# Patient Record
Sex: MALE | Race: WHITE | NOT HISPANIC OR LATINO | Employment: OTHER | URBAN - METROPOLITAN AREA
[De-identification: names, ages, dates, MRNs, and addresses within clinical notes are randomized per-mention and may not be internally consistent; named-entity substitution may affect disease eponyms.]

---

## 2020-11-10 ENCOUNTER — TELEPHONE (OUTPATIENT)
Dept: PAIN MEDICINE | Facility: CLINIC | Age: 25
End: 2020-11-10

## 2020-11-18 ENCOUNTER — CONSULT (OUTPATIENT)
Dept: PAIN MEDICINE | Facility: CLINIC | Age: 25
End: 2020-11-18
Payer: COMMERCIAL

## 2020-11-18 VITALS
WEIGHT: 169.6 LBS | BODY MASS INDEX: 24.28 KG/M2 | SYSTOLIC BLOOD PRESSURE: 112 MMHG | TEMPERATURE: 97.4 F | DIASTOLIC BLOOD PRESSURE: 65 MMHG | HEIGHT: 70 IN | RESPIRATION RATE: 18 BRPM | HEART RATE: 80 BPM

## 2020-11-18 DIAGNOSIS — M54.50 CHRONIC BILATERAL LOW BACK PAIN WITHOUT SCIATICA: ICD-10-CM

## 2020-11-18 DIAGNOSIS — G89.29 CHRONIC BILATERAL LOW BACK PAIN WITHOUT SCIATICA: ICD-10-CM

## 2020-11-18 DIAGNOSIS — M51.36 LUMBAR DEGENERATIVE DISC DISEASE: ICD-10-CM

## 2020-11-18 DIAGNOSIS — M79.18 MYOFASCIAL PAIN SYNDROME: Primary | ICD-10-CM

## 2020-11-18 PROCEDURE — 99244 OFF/OP CNSLTJ NEW/EST MOD 40: CPT | Performed by: STUDENT IN AN ORGANIZED HEALTH CARE EDUCATION/TRAINING PROGRAM

## 2020-11-18 RX ORDER — BUPROPION HYDROCHLORIDE 150 MG/1
150 TABLET ORAL
COMMUNITY
Start: 2020-08-18 | End: 2021-08-18

## 2020-11-18 RX ORDER — ALBUTEROL SULFATE 2.5 MG/3ML
2.5 SOLUTION RESPIRATORY (INHALATION) EVERY 4 HOURS PRN
COMMUNITY
Start: 2020-09-28 | End: 2021-09-28

## 2020-11-18 RX ORDER — TIOTROPIUM BROMIDE INHALATION SPRAY 3.12 UG/1
SPRAY, METERED RESPIRATORY (INHALATION) DAILY
COMMUNITY
Start: 2020-08-18

## 2020-11-18 RX ORDER — MONTELUKAST SODIUM 10 MG/1
1 TABLET ORAL DAILY
COMMUNITY
Start: 2020-07-06

## 2020-11-18 RX ORDER — ALBUTEROL SULFATE 2.5 MG/3ML
SOLUTION RESPIRATORY (INHALATION)
COMMUNITY
Start: 2020-11-10

## 2020-11-18 RX ORDER — ESOMEPRAZOLE MAGNESIUM 40 MG/1
40 CAPSULE, DELAYED RELEASE ORAL
COMMUNITY
Start: 2020-08-18 | End: 2021-08-18

## 2020-11-18 RX ORDER — HYDROXYZINE HYDROCHLORIDE 25 MG/1
TABLET, FILM COATED ORAL
COMMUNITY
Start: 2020-11-10

## 2020-12-03 ENCOUNTER — TELEPHONE (OUTPATIENT)
Dept: RADIOLOGY | Facility: CLINIC | Age: 25
End: 2020-12-03

## 2020-12-08 ENCOUNTER — TELEPHONE (OUTPATIENT)
Dept: PAIN MEDICINE | Facility: CLINIC | Age: 25
End: 2020-12-08

## 2020-12-23 ENCOUNTER — PROCEDURE VISIT (OUTPATIENT)
Dept: PAIN MEDICINE | Facility: CLINIC | Age: 25
End: 2020-12-23
Payer: COMMERCIAL

## 2020-12-23 VITALS
WEIGHT: 173 LBS | HEART RATE: 88 BPM | HEIGHT: 70 IN | BODY MASS INDEX: 24.77 KG/M2 | DIASTOLIC BLOOD PRESSURE: 90 MMHG | RESPIRATION RATE: 18 BRPM | SYSTOLIC BLOOD PRESSURE: 136 MMHG

## 2020-12-23 DIAGNOSIS — M79.18 MYOFASCIAL PAIN SYNDROME: Primary | ICD-10-CM

## 2020-12-23 PROCEDURE — 20553 NJX 1/MLT TRIGGER POINTS 3/>: CPT | Performed by: STUDENT IN AN ORGANIZED HEALTH CARE EDUCATION/TRAINING PROGRAM

## 2020-12-23 RX ORDER — BUPIVACAINE HYDROCHLORIDE 2.5 MG/ML
4 INJECTION, SOLUTION EPIDURAL; INFILTRATION; INTRACAUDAL ONCE
Status: COMPLETED | OUTPATIENT
Start: 2020-12-23 | End: 2020-12-23

## 2020-12-23 RX ORDER — METHYLPREDNISOLONE ACETATE 40 MG/ML
40 INJECTION, SUSPENSION INTRA-ARTICULAR; INTRALESIONAL; INTRAMUSCULAR; SOFT TISSUE ONCE
Status: COMPLETED | OUTPATIENT
Start: 2020-12-23 | End: 2020-12-23

## 2020-12-23 RX ADMIN — BUPIVACAINE HYDROCHLORIDE 4 ML: 2.5 INJECTION, SOLUTION EPIDURAL; INFILTRATION; INTRACAUDAL at 12:17

## 2020-12-23 RX ADMIN — METHYLPREDNISOLONE ACETATE 40 MG: 40 INJECTION, SUSPENSION INTRA-ARTICULAR; INTRALESIONAL; INTRAMUSCULAR; SOFT TISSUE at 12:17

## 2021-01-22 ENCOUNTER — TELEPHONE (OUTPATIENT)
Dept: PAIN MEDICINE | Facility: CLINIC | Age: 26
End: 2021-01-22

## 2021-01-22 NOTE — TELEPHONE ENCOUNTER
S/w pt's wife per ROBBIN  States TPI on 12/23 helped for 1-2 weeks but pain returned to preprocedure level  Denies s/s of infection  States lower back has "knots" same as prior to procedure  Pt takes nothing OTC for pain at this time and has no f/u scheduled    Please advise next step

## 2021-01-22 NOTE — TELEPHONE ENCOUNTER
S/w pt and advised same  Pt is interested in injection  Wife and child currently have covid   Pt awaiting his covid test

## 2021-01-22 NOTE — TELEPHONE ENCOUNTER
He was taking ibuprofen and I advised he could also supplement with acetaminophen 1000mg TID PRN  Also can use ice 20min on 20 min off  We discussed possible GEOFF at his first visit which we can schedule to try to get him relief

## 2021-01-22 NOTE — TELEPHONE ENCOUNTER
Pt's wife Divya Colon is calling to say that the patients lower back is painful and swollen  He had a procedure on 12/23/20  She said they were told he would get a call after the procedure but they never received on  Please advise   # [phone]

## 2021-01-25 NOTE — TELEPHONE ENCOUNTER
S/w pt and scheduled LESI for 2/9/21 @ 2 pm  Gave pre procedure instructions, masking//vaccine policy and COVID screening  Per pt COVID test came back negative, will keep us updated if he develops any symptoms

## 2021-02-09 ENCOUNTER — TELEPHONE (OUTPATIENT)
Dept: RADIOLOGY | Facility: CLINIC | Age: 26
End: 2021-02-09

## 2021-02-09 ENCOUNTER — HOSPITAL ENCOUNTER (OUTPATIENT)
Dept: RADIOLOGY | Facility: CLINIC | Age: 26
Discharge: HOME | End: 2021-02-09

## 2021-02-12 NOTE — TELEPHONE ENCOUNTER
Patient called back to provide name of location he went to [facility] in [facility] [address]     He went there 4 times had to stop due to pain       Thank you

## 2021-02-12 NOTE — TELEPHONE ENCOUNTER
[facility] is requesting full physical therapy notes  Lmovm for pt to call back with info on where he had PT so I can obtain those records to send to [facility]

## 2021-02-16 NOTE — TELEPHONE ENCOUNTER
Duplicate task    Patient called back to provide name of location he went to [facility] in [facility]      He went there 4 times had to stop due to pain  S/w Misael Mendoza @ CPT and requested PT notes

## 2021-02-18 NOTE — TELEPHONE ENCOUNTER
Patient wife is calling in to say that her  was approved to get injections    approved from 2/4/21, please follow up

## 2021-02-19 NOTE — TELEPHONE ENCOUNTER
Rec [facility] auth  S/w pt and scheduled LESI for 2/23/21 315 pm  Gave pre procedure instructions, masking//vaccine policy and COVID screening

## 2021-02-19 NOTE — TELEPHONE ENCOUNTER
S/w pt and scheduled LESI for 2/23/21 315 pm  Gave pre procedure instructions, masking//vaccine policy and COVID screening

## 2021-02-22 NOTE — TELEPHONE ENCOUNTER
Pt wishes to be rescheduled for this Friday if possible  Patient has a scheduling conflict   Thank you       michelle [phone]

## 2021-02-25 ENCOUNTER — HOSPITAL ENCOUNTER (OUTPATIENT)
Dept: RADIOLOGY | Facility: CLINIC | Age: 26
Discharge: HOME | End: 2021-02-25
Attending: STUDENT IN AN ORGANIZED HEALTH CARE EDUCATION/TRAINING PROGRAM | Admitting: STUDENT IN AN ORGANIZED HEALTH CARE EDUCATION/TRAINING PROGRAM
Payer: COMMERCIAL

## 2021-02-25 VITALS
RESPIRATION RATE: 20 BRPM | HEART RATE: 98 BPM | OXYGEN SATURATION: 98 % | SYSTOLIC BLOOD PRESSURE: 160 MMHG | DIASTOLIC BLOOD PRESSURE: 98 MMHG | TEMPERATURE: 97.7 F

## 2021-02-25 DIAGNOSIS — M51.36 DEGENERATIVE DISC DISEASE, LUMBAR: ICD-10-CM

## 2021-02-25 PROCEDURE — 62323 NJX INTERLAMINAR LMBR/SAC: CPT | Performed by: STUDENT IN AN ORGANIZED HEALTH CARE EDUCATION/TRAINING PROGRAM

## 2021-02-25 RX ORDER — 0.9 % SODIUM CHLORIDE 0.9 %
4 VIAL (ML) INJECTION ONCE
Status: COMPLETED | OUTPATIENT
Start: 2021-02-25 | End: 2021-02-25

## 2021-02-25 RX ORDER — METHYLPREDNISOLONE ACETATE 80 MG/ML
80 INJECTION, SUSPENSION INTRA-ARTICULAR; INTRALESIONAL; INTRAMUSCULAR; PARENTERAL; SOFT TISSUE ONCE
Status: COMPLETED | OUTPATIENT
Start: 2021-02-25 | End: 2021-02-25

## 2021-02-25 RX ORDER — LIDOCAINE HYDROCHLORIDE 10 MG/ML
5 INJECTION, SOLUTION EPIDURAL; INFILTRATION; INTRACAUDAL; PERINEURAL ONCE
Status: COMPLETED | OUTPATIENT
Start: 2021-02-25 | End: 2021-02-25

## 2021-02-25 RX ADMIN — LIDOCAINE HYDROCHLORIDE 2 ML: 10 INJECTION, SOLUTION EPIDURAL; INFILTRATION; INTRACAUDAL; PERINEURAL at 09:35

## 2021-02-25 RX ADMIN — METHYLPREDNISOLONE ACETATE 80 MG: 80 INJECTION, SUSPENSION INTRA-ARTICULAR; INTRALESIONAL; INTRAMUSCULAR; PARENTERAL; SOFT TISSUE at 09:42

## 2021-02-25 RX ADMIN — IOHEXOL 1 ML: 300 INJECTION, SOLUTION INTRAVENOUS at 09:41

## 2021-02-25 RX ADMIN — SODIUM CHLORIDE 4 ML: 9 INJECTION INTRAMUSCULAR; INTRAVENOUS; SUBCUTANEOUS at 09:42

## 2021-02-25 NOTE — H&P
History of Present Illness: The patient is a 30 y o  male who presents with complaints of chronic bilateral low back pain    There is no problem list on file for this patient  Past Medical History:   Diagnosis Date    Asthma        No past surgical history on file  Current Outpatient Medications:     albuterol (2 5 mg/3 mL) 0 083 % nebulizer solution, Inhale 2 5 mg every 4 (four) hours as needed, Disp: , Rfl:     albuterol (2 5 mg/3 mL) 0 083 % nebulizer solution, TAKE 1 VIAL BY NEBULIZATION EVERY 4 HOURS AS NEEDED FOR WHEEZING , Disp: , Rfl:     buPROPion (WELLBUTRIN XL) 150 mg 24 hr tablet, Take 150 mg by mouth, Disp: , Rfl:     esomeprazole (NexIUM) 40 MG capsule, Take 40 mg by mouth, Disp: , Rfl:     hydrOXYzine HCL (ATARAX) 25 mg tablet, TAKE 1 TABLET (25 MG TOTAL) BY MOUTH EVERY 6 (SIX) HOURS AS NEEDED FOR ITCHING , Disp: , Rfl:     montelukast (SINGULAIR) 10 mg tablet, Take 1 tablet by mouth daily, Disp: , Rfl:     tiotropium (Spiriva Respimat) 2 5 MCG/ACT AERS inhaler, Inhale daily, Disp: , Rfl:     No Known Allergies    Physical Exam:   Vitals:    02/25/21 0925   BP: 157/93   Pulse: 82   Resp: 20   Temp: 97 7 °F (36 5 °C)   SpO2: 98%     General: Awake, Alert, Oriented x 3, Mood and affect appropriate  Respiratory: Respirations even and unlabored  Cardiovascular: Peripheral pulses intact; no edema  Musculoskeletal Exam: chronci bilateral low back pain    ASA Score: 2    Patient/Chart Verification  Patient ID Verified: Verbal  ID Band Applied: No  Consents Confirmed: To be obtained in the Pre-Procedure area  H&P( within 30 days) Verified: To be obtained in the Pre-Procedure area  Interval H&P(within 24 hr) Complete (required for Outpatients and Surgery Admit only): To be obtained in the Pre-Procedure area  Allergies Reviewed: Yes  Anticoag/NSAID held?: NA  Currently on antibiotics?: No  Pregnancy denied?: NA    Assessment:   1   Degenerative disc disease, lumbar        Plan: TOMER

## 2021-02-25 NOTE — DISCHARGE INSTR - LAB
Epidural Steroid Injection   WHAT YOU NEED TO KNOW:   An epidural steroid injection (GEOFF) is a procedure to inject steroid medicine into the epidural space  The epidural space is between your spinal cord and vertebrae  Steroids reduce inflammation and fluid buildup in your spine that may be causing pain  You may be given pain medicine along with the steroids  ACTIVITY  · Do not drive or operate machinery today  · No strenuous activity today - bending, lifting, etc   · You may resume normal activites starting tomorrow - start slowly and as tolerated  · You may shower today, but no tub baths or hot tubs  · You may have numbness for several hours from the local anesthetic  Please use caution and common sense, especially with weight-bearing activities  CARE OF THE INJECTION SITE  · If you have soreness or pain, apply ice to the area today (20 minutes on/20 minutes off)  · Starting tomorrow, you may use warm, moist heat or ice if needed  · You may have an increase or change in your discomfort for 36-48 hours after your treatment  · Apply ice and continue with any pain medication you have been prescribed  · Notify [facility] if you have any of the following: redness, drainage, swelling, headache, stiff neck or fever above 100°F     SPECIAL INSTRUCTIONS  · Our office will contact you in approximately 7 days for a progress report  MEDICATIONS  · Continue to take all routine medications  · Our office may have instructed you to hold some medications  If you have a problem specifically related to your procedure, please call our office at [phone]  Problems not related to your procedure should be directed to your primary care physician

## 2021-03-04 ENCOUNTER — TELEPHONE (OUTPATIENT)
Dept: PAIN MEDICINE | Facility: CLINIC | Age: 26
End: 2021-03-04

## 2021-03-04 NOTE — TELEPHONE ENCOUNTER
FYI:  S/W pt regarding c/o rash at injection site mentioned below  Pt states rash is about 3" lower than injection site, and started about 3 days ago  States more like hives but appears to be getting better and is less itchy today  States band-aid was off within about 3 hours of injection  Has not used anything OTC as he wants to stay away from doing that unless it's really bad  States the actual injection site is fine  Denies drainage  Advised could be from band-aid or solution used to clean back, most likely not from [facility]  Advised to use OTC anti-itch cream like Hydrocortisone or Benadryl cream if needed    CB in a week if not improved     F/U appt made

## 2021-03-04 NOTE — TELEPHONE ENCOUNTER
Patient states he has 0 % of improvement & pain level 4-6/10 toward the end of the day  and a rash by the injection area  Please be advise thank you      Patient call back # [phone]

## 2021-03-15 NOTE — TELEPHONE ENCOUNTER
Patient's wife Kelsey Arellano call stating patient has lower back inflammation & pain  He would like to speak to a nurse regarding his status & next plan of care   Please advise,katya    Call back# [phone]

## 2021-03-15 NOTE — TELEPHONE ENCOUNTER
Pt is calling back bc he is in pain  The pain level is a 7/10, this is the worst pain be has been in   Please follow up thank you     CB: [phone]

## 2021-03-15 NOTE — TELEPHONE ENCOUNTER
S/w pt  States pain has become constant over the last few days to lower back  Describes it as an ache unrelieved with tylenol, advil, ice or heat  Pt has also tried topical lidocaine patches  Pt states he has an "inflammation" to lower back which he had prior to epidural but has persisted  States epidural provided no relief  Pt states he is frustrated because he has not been able to work the last few days due to pain   Asking for recommendations for pain

## 2021-03-16 NOTE — TELEPHONE ENCOUNTER
We will reevaluate in the office   I don't know that there will be anything additional that we could offer

## 2021-03-17 ENCOUNTER — OFFICE VISIT (OUTPATIENT)
Dept: PAIN MEDICINE | Facility: CLINIC | Age: 26
End: 2021-03-17
Payer: COMMERCIAL

## 2021-03-17 VITALS
HEART RATE: 71 BPM | HEIGHT: 70 IN | SYSTOLIC BLOOD PRESSURE: 137 MMHG | BODY MASS INDEX: 25.37 KG/M2 | WEIGHT: 177.2 LBS | DIASTOLIC BLOOD PRESSURE: 78 MMHG | RESPIRATION RATE: 16 BRPM

## 2021-03-17 DIAGNOSIS — M51.36 LUMBAR DEGENERATIVE DISC DISEASE: ICD-10-CM

## 2021-03-17 DIAGNOSIS — M46.1 SACROILIITIS (HCC): ICD-10-CM

## 2021-03-17 DIAGNOSIS — G89.29 CHRONIC BILATERAL LOW BACK PAIN WITHOUT SCIATICA: Primary | ICD-10-CM

## 2021-03-17 DIAGNOSIS — M54.50 CHRONIC BILATERAL LOW BACK PAIN WITHOUT SCIATICA: Primary | ICD-10-CM

## 2021-03-17 PROCEDURE — 99214 OFFICE O/P EST MOD 30 MIN: CPT | Performed by: STUDENT IN AN ORGANIZED HEALTH CARE EDUCATION/TRAINING PROGRAM

## 2021-03-17 RX ORDER — MELOXICAM 15 MG/1
15 TABLET ORAL DAILY PRN
Qty: 30 TABLET | Refills: 0 | Status: SHIPPED | OUTPATIENT
Start: 2021-03-17

## 2021-03-17 RX ORDER — METHOCARBAMOL 500 MG/1
500 TABLET, FILM COATED ORAL 3 TIMES DAILY
Qty: 60 TABLET | Refills: 0 | Status: SHIPPED | OUTPATIENT
Start: 2021-03-17

## 2021-03-17 NOTE — PROGRESS NOTES
Pain Medicine Follow-Up Note    Assessment:  1  Chronic bilateral low back pain without sciatica    2  Lumbar degenerative disc disease    3  Sacroiliitis ([address] )        Plan:  Orders Placed This Encounter   Procedures    FL spine and pain procedure     Standing Status:   Future     Standing Expiration Date:   3/17/2025     Order Specific Question:   Reason for Exam:     Answer:   b/l SI joint injection     Order Specific Question:   Anticoagulant hold needed? Answer:   NO    Ambulatory referral to Physical Therapy     Standing Status:   Future     Standing Expiration Date:   3/17/2022     Referral Priority:   Routine     Referral Type:   Physical Therapy     Referral Reason:   Specialty Services Required     Requested Specialty:   Physical Therapy     Number of Visits Requested:   1     Expiration Date:   3/17/2022       New Medications Ordered This Visit   Medications    methocarbamol (ROBAXIN) 500 mg tablet     Sig: Take 1 tablet (500 mg total) by mouth 3 (three) times a day     Dispense:  60 tablet     Refill:  0    meloxicam (MOBIC) 15 mg tablet     Sig: Take 1 tablet (15 mg total) by mouth daily as needed for moderate pain     Dispense:  30 tablet     Refill:  0       My impressions and treatment recommendations were discussed in detail with the patient who verbalized understanding and had no further questions  This is a 78-year-old male who returns to our office following lumbar epidural steroid injection on 02/25/2021 with no relief in his symptoms  He reports that his back pain is gone worse  On exam, he has tenderness to palpation over bilateral sacroiliac joints with pain on provocative maneuvers as noted below  He has mild pain with bilateral facet loading  His range of motion is diminished in all planes due to stiffness and pain  He does again have multilevel degenerative disc disease  With multilevel mild-to-moderate foraminal stenosis of the lumbar spine    Also with mild facet arthropathy at L5-S1  Etiology of symptoms are not entirely clear and likely multifactorial       Given suggestion of sacroiliac pain,  Discussed bilateral sacroiliac joint injection under fluoroscopic guidance  The procedure will be diagnostic and potentially therapeutic  Complete risks and benefits including bleeding, infection, tissue reaction, nerve injury and allergic reaction were discussed  The approach was demonstrated using models and literature was provided  Verbal and written consent was obtained  Future treatment options include diagnostic medial branch block at the L4-5 and L5-S1 levels  to help with his pain currently, will order Mobic 15 mg daily p r n  as well as Robaxin 500 mg t i d  p r n  for muscle spasm  Advised not to take Robaxin before driving  We also discussed the importance and benefits of physical therapy in addressing the myofascial components of the pain and improving the patient's body mechanics via a home exercise and stretching program  After discussing possible benefits and indications for physical therapy, the patient was agreeable and a prescription was provided  Regular participation in his home exercise program was discussed and recommended  For the myofascial component of his symptoms I would like for the patient to return to physical therapy  He did fail   Land-based therapy in the past   Given his stiffness and decreased lumbar range of motion, I feel that he would benefit from aquatherapy  Referral was provided today  Patient does live quite far away, therefore he will check facilities that may provided outside of our Network  [place] Prescription Drug Monitoring Program report was reviewed and was appropriate     Discharge instructions were provided  I personally saw and examined the patient and I agree with the above discussed plan of care      History of Present Illness:    Billy Washington is a 30 y o  male who presents to [facility] for interval re-evaluation of the above stated pain complaints  The patient has a past medical and chronic pain history as outlined in the assessment section  He was last seen on   02/25/2021 at which time he underwent L5-S1 lumbar epidural steroid injection with no improvement in his symptoms  He reports his pain is actually worse since that time  Pain is 6/10 and located in the same area of the lower back  Denies bilateral lower extremity radiculopathy  Denies fever or redness over the injection site  Pain is worse in the evening and night  Pain is constant  Pain is dull/aching, cramping  He is pointing to the bilateral sacroiliac joints as a source of his symptoms with most of the pain on localizing in the center of his back  He is quite frustrated with the lack of improvement in his symptoms and reports that it is significantly limiting his ability to work  He was able to only work for about 1-2 hours at his last work day  Other than as stated above, the patient denies any interval changes in medications, medical condition, mental condition, symptoms, or allergies since the last office visit  Review of Systems:    Review of Systems   Respiratory: Negative for shortness of breath  Cardiovascular: Negative for chest pain  Gastrointestinal: Negative for constipation, diarrhea, nausea and vomiting  Musculoskeletal: Positive for gait problem  Negative for arthralgias, joint swelling and myalgias  Decreased range of motion, swelling and pain in extremity  Skin: Negative for rash  Neurological: Negative for dizziness, seizures and weakness  All other systems reviewed and are negative  There is no problem list on file for this patient  Past Medical History:   Diagnosis Date    Asthma        History reviewed  No pertinent surgical history  History reviewed  No pertinent family history      Social History     Occupational History    Not on file Tobacco Use    Smoking status: Current Every Day Smoker    Smokeless tobacco: Never Used   Substance and Sexual Activity    Alcohol use: Not Currently    Drug use: Not Currently    Sexual activity: Not on file         Current Outpatient Medications:     albuterol (2 5 mg/3 mL) 0 083 % nebulizer solution, Inhale 2 5 mg every 4 (four) hours as needed, Disp: , Rfl:     albuterol (2 5 mg/3 mL) 0 083 % nebulizer solution, TAKE 1 VIAL BY NEBULIZATION EVERY 4 HOURS AS NEEDED FOR WHEEZING , Disp: , Rfl:     buPROPion (WELLBUTRIN XL) 150 mg 24 hr tablet, Take 150 mg by mouth, Disp: , Rfl:     esomeprazole (NexIUM) 40 MG capsule, Take 40 mg by mouth, Disp: , Rfl:     hydrOXYzine HCL (ATARAX) 25 mg tablet, TAKE 1 TABLET (25 MG TOTAL) BY MOUTH EVERY 6 (SIX) HOURS AS NEEDED FOR ITCHING , Disp: , Rfl:     montelukast (SINGULAIR) 10 mg tablet, Take 1 tablet by mouth daily, Disp: , Rfl:     tiotropium (Spiriva Respimat) 2 5 MCG/ACT AERS inhaler, Inhale daily, Disp: , Rfl:     meloxicam (MOBIC) 15 mg tablet, Take 1 tablet (15 mg total) by mouth daily as needed for moderate pain, Disp: 30 tablet, Rfl: 0    methocarbamol (ROBAXIN) 500 mg tablet, Take 1 tablet (500 mg total) by mouth 3 (three) times a day, Disp: 60 tablet, Rfl: 0    No Known Allergies    Physical Exam:    /78   Pulse 71   Resp 16   Ht 5' 10" (1 778 m)   Wt 80 4 kg (177 lb 3 2 oz)   BMI 25 43 kg/m²     Constitutional:normal, well developed, well nourished, alert, in no distress and non-toxic and no overt pain behavior  Eyes:anicteric  HEENT:grossly intact  Neck:supple, symmetric, trachea midline and no masses   Pulmonary:even and unlabored  Cardiovascular:No edema or pitting edema present  Skin:Normal without rashes or lesions and well hydrated  Psychiatric:Mood and affect appropriate  Neurologic:Cranial Nerves II-XII grossly intact  Musculoskeletal: Bilateral sacroiliac joint tenderness  Positive thigh thrust maneuver bilaterally    Positive Sylvain's bilaterally  Positive MICHEL bilaterally        Imaging  FL spine and pain procedure    (Results Pending)     MRI 9/2020- L3/4- MM b/l FN, L4/5: DP w/ b/l LRN, poss impingement of traversing L5 nerve roots w/ mod  b/l NF stenosis, mild/mod canal narrowing , L5/S1: BBDP w/ mild facet, R>L LRN      Orders Placed This Encounter   Procedures    FL spine and pain procedure    Ambulatory referral to Physical Therapy

## 2021-03-17 NOTE — H&P (VIEW-ONLY)
Pain Medicine Follow-Up Note    Assessment:  1  Chronic bilateral low back pain without sciatica    2  Lumbar degenerative disc disease    3  Sacroiliitis ([address] )        Plan:  Orders Placed This Encounter   Procedures    FL spine and pain procedure     Standing Status:   Future     Standing Expiration Date:   3/17/2025     Order Specific Question:   Reason for Exam:     Answer:   b/l SI joint injection     Order Specific Question:   Anticoagulant hold needed? Answer:   NO    Ambulatory referral to Physical Therapy     Standing Status:   Future     Standing Expiration Date:   3/17/2022     Referral Priority:   Routine     Referral Type:   Physical Therapy     Referral Reason:   Specialty Services Required     Requested Specialty:   Physical Therapy     Number of Visits Requested:   1     Expiration Date:   3/17/2022       New Medications Ordered This Visit   Medications    methocarbamol (ROBAXIN) 500 mg tablet     Sig: Take 1 tablet (500 mg total) by mouth 3 (three) times a day     Dispense:  60 tablet     Refill:  0    meloxicam (MOBIC) 15 mg tablet     Sig: Take 1 tablet (15 mg total) by mouth daily as needed for moderate pain     Dispense:  30 tablet     Refill:  0       My impressions and treatment recommendations were discussed in detail with the patient who verbalized understanding and had no further questions  This is a 57-year-old male who returns to our office following lumbar epidural steroid injection on 02/25/2021 with no relief in his symptoms  He reports that his back pain is gone worse  On exam, he has tenderness to palpation over bilateral sacroiliac joints with pain on provocative maneuvers as noted below  He has mild pain with bilateral facet loading  His range of motion is diminished in all planes due to stiffness and pain  He does again have multilevel degenerative disc disease  With multilevel mild-to-moderate foraminal stenosis of the lumbar spine    Also with mild facet arthropathy at L5-S1  Etiology of symptoms are not entirely clear and likely multifactorial       Given suggestion of sacroiliac pain,  Discussed bilateral sacroiliac joint injection under fluoroscopic guidance  The procedure will be diagnostic and potentially therapeutic  Complete risks and benefits including bleeding, infection, tissue reaction, nerve injury and allergic reaction were discussed  The approach was demonstrated using models and literature was provided  Verbal and written consent was obtained  Future treatment options include diagnostic medial branch block at the L4-5 and L5-S1 levels  to help with his pain currently, will order Mobic 15 mg daily p r n  as well as Robaxin 500 mg t i d  p r n  for muscle spasm  Advised not to take Robaxin before driving  We also discussed the importance and benefits of physical therapy in addressing the myofascial components of the pain and improving the patient's body mechanics via a home exercise and stretching program  After discussing possible benefits and indications for physical therapy, the patient was agreeable and a prescription was provided  Regular participation in his home exercise program was discussed and recommended  For the myofascial component of his symptoms I would like for the patient to return to physical therapy  He did fail   Land-based therapy in the past   Given his stiffness and decreased lumbar range of motion, I feel that he would benefit from aquatherapy  Referral was provided today  Patient does live quite far away, therefore he will check facilities that may provided outside of our Network  [place] Prescription Drug Monitoring Program report was reviewed and was appropriate     Discharge instructions were provided  I personally saw and examined the patient and I agree with the above discussed plan of care      History of Present Illness:    Kim Nguyen is a 30 y o  male who presents to [facility] for interval re-evaluation of the above stated pain complaints  The patient has a past medical and chronic pain history as outlined in the assessment section  He was last seen on   02/25/2021 at which time he underwent L5-S1 lumbar epidural steroid injection with no improvement in his symptoms  He reports his pain is actually worse since that time  Pain is 6/10 and located in the same area of the lower back  Denies bilateral lower extremity radiculopathy  Denies fever or redness over the injection site  Pain is worse in the evening and night  Pain is constant  Pain is dull/aching, cramping  He is pointing to the bilateral sacroiliac joints as a source of his symptoms with most of the pain on localizing in the center of his back  He is quite frustrated with the lack of improvement in his symptoms and reports that it is significantly limiting his ability to work  He was able to only work for about 1-2 hours at his last work day  Other than as stated above, the patient denies any interval changes in medications, medical condition, mental condition, symptoms, or allergies since the last office visit  Review of Systems:    Review of Systems   Respiratory: Negative for shortness of breath  Cardiovascular: Negative for chest pain  Gastrointestinal: Negative for constipation, diarrhea, nausea and vomiting  Musculoskeletal: Positive for gait problem  Negative for arthralgias, joint swelling and myalgias  Decreased range of motion, swelling and pain in extremity  Skin: Negative for rash  Neurological: Negative for dizziness, seizures and weakness  All other systems reviewed and are negative  There is no problem list on file for this patient  Past Medical History:   Diagnosis Date    Asthma        History reviewed  No pertinent surgical history  History reviewed  No pertinent family history      Social History     Occupational History    Not on file Tobacco Use    Smoking status: Current Every Day Smoker    Smokeless tobacco: Never Used   Substance and Sexual Activity    Alcohol use: Not Currently    Drug use: Not Currently    Sexual activity: Not on file         Current Outpatient Medications:     albuterol (2 5 mg/3 mL) 0 083 % nebulizer solution, Inhale 2 5 mg every 4 (four) hours as needed, Disp: , Rfl:     albuterol (2 5 mg/3 mL) 0 083 % nebulizer solution, TAKE 1 VIAL BY NEBULIZATION EVERY 4 HOURS AS NEEDED FOR WHEEZING , Disp: , Rfl:     buPROPion (WELLBUTRIN XL) 150 mg 24 hr tablet, Take 150 mg by mouth, Disp: , Rfl:     esomeprazole (NexIUM) 40 MG capsule, Take 40 mg by mouth, Disp: , Rfl:     hydrOXYzine HCL (ATARAX) 25 mg tablet, TAKE 1 TABLET (25 MG TOTAL) BY MOUTH EVERY 6 (SIX) HOURS AS NEEDED FOR ITCHING , Disp: , Rfl:     montelukast (SINGULAIR) 10 mg tablet, Take 1 tablet by mouth daily, Disp: , Rfl:     tiotropium (Spiriva Respimat) 2 5 MCG/ACT AERS inhaler, Inhale daily, Disp: , Rfl:     meloxicam (MOBIC) 15 mg tablet, Take 1 tablet (15 mg total) by mouth daily as needed for moderate pain, Disp: 30 tablet, Rfl: 0    methocarbamol (ROBAXIN) 500 mg tablet, Take 1 tablet (500 mg total) by mouth 3 (three) times a day, Disp: 60 tablet, Rfl: 0    No Known Allergies    Physical Exam:    /78   Pulse 71   Resp 16   Ht 5' 10" (1 778 m)   Wt 80 4 kg (177 lb 3 2 oz)   BMI 25 43 kg/m²     Constitutional:normal, well developed, well nourished, alert, in no distress and non-toxic and no overt pain behavior  Eyes:anicteric  HEENT:grossly intact  Neck:supple, symmetric, trachea midline and no masses   Pulmonary:even and unlabored  Cardiovascular:No edema or pitting edema present  Skin:Normal without rashes or lesions and well hydrated  Psychiatric:Mood and affect appropriate  Neurologic:Cranial Nerves II-XII grossly intact  Musculoskeletal: Bilateral sacroiliac joint tenderness  Positive thigh thrust maneuver bilaterally    Positive Sylvain's bilaterally  Positive MICHEL bilaterally        Imaging  FL spine and pain procedure    (Results Pending)     MRI 9/2020- L3/4- MM b/l FN, L4/5: DP w/ b/l LRN, poss impingement of traversing L5 nerve roots w/ mod  b/l NF stenosis, mild/mod canal narrowing , L5/S1: BBDP w/ mild facet, R>L LRN      Orders Placed This Encounter   Procedures    FL spine and pain procedure    Ambulatory referral to Physical Therapy

## 2021-03-29 ENCOUNTER — TELEPHONE (OUTPATIENT)
Dept: PAIN MEDICINE | Facility: CLINIC | Age: 26
End: 2021-03-29

## 2021-03-29 NOTE — TELEPHONE ENCOUNTER
Pts wife Chely Ram is calling to report they rec'd an approval letter for a procedure for him  Caller dropped phone call while [employer]

## 2021-03-31 NOTE — TELEPHONE ENCOUNTER
S/w pt and advised that rec Howie Head for  but CPT 59488 is still pending  Will call back to schedule when rec koki for [phone]   Pt is going to have COVID vaccine next week, did advise of policy of no vaccine 2 weeks prior/post

## 2021-04-06 NOTE — TELEPHONE ENCOUNTER
Maria C Melissa  S/w pt and scheduled Bilateral SIJ injection for 4/15/21 10 am  Gave pre procedure instructions, masking//vaccine policy and COVID screening

## 2021-04-15 ENCOUNTER — HOSPITAL ENCOUNTER (OUTPATIENT)
Dept: RADIOLOGY | Facility: CLINIC | Age: 26
Discharge: HOME | End: 2021-04-15
Admitting: STUDENT IN AN ORGANIZED HEALTH CARE EDUCATION/TRAINING PROGRAM
Payer: COMMERCIAL

## 2021-04-15 VITALS
TEMPERATURE: 96.9 F | OXYGEN SATURATION: 98 % | RESPIRATION RATE: 20 BRPM | SYSTOLIC BLOOD PRESSURE: 149 MMHG | DIASTOLIC BLOOD PRESSURE: 102 MMHG | HEART RATE: 98 BPM

## 2021-04-15 DIAGNOSIS — M46.1 SACROILIITIS (HCC): ICD-10-CM

## 2021-04-15 PROCEDURE — 27096 INJECT SACROILIAC JOINT: CPT | Performed by: STUDENT IN AN ORGANIZED HEALTH CARE EDUCATION/TRAINING PROGRAM

## 2021-04-15 RX ORDER — BUPIVACAINE HCL/PF 2.5 MG/ML
3 VIAL (ML) INJECTION ONCE
Status: COMPLETED | OUTPATIENT
Start: 2021-04-15 | End: 2021-04-15

## 2021-04-15 RX ORDER — METHYLPREDNISOLONE ACETATE 80 MG/ML
80 INJECTION, SUSPENSION INTRA-ARTICULAR; INTRALESIONAL; INTRAMUSCULAR; PARENTERAL; SOFT TISSUE ONCE
Status: COMPLETED | OUTPATIENT
Start: 2021-04-15 | End: 2021-04-15

## 2021-04-15 RX ADMIN — Medication 3 ML: at 10:26

## 2021-04-15 RX ADMIN — METHYLPREDNISOLONE ACETATE 80 MG: 80 INJECTION, SUSPENSION INTRA-ARTICULAR; INTRALESIONAL; INTRAMUSCULAR; PARENTERAL; SOFT TISSUE at 10:28

## 2021-04-15 RX ADMIN — IOHEXOL 1 ML: 300 INJECTION, SOLUTION INTRAVENOUS at 10:26

## 2021-04-15 NOTE — INTERVAL H&P NOTE
Update: (This section must be completed if the H&P was completed greater than 24 hrs to procedure or admission)    H&P reviewed  After examining the patient, I find no changed to the H&P since it had been written  Patient re-evaluated   Accept as history and physical     Divya Doll MD/April 15, 2021/10:32 AM

## 2021-04-15 NOTE — DISCHARGE INSTR - LAB

## 2021-04-22 ENCOUNTER — TELEPHONE (OUTPATIENT)
Dept: PAIN MEDICINE | Facility: CLINIC | Age: 26
End: 2021-04-22

## 2021-04-26 NOTE — TELEPHONE ENCOUNTER
Aware  Thanks  If no improvement only other option to consider is diagnostic mbb   Unfortunately we are having difficulty identifying his pain generator

## 2021-05-06 DIAGNOSIS — G89.29 CHRONIC BILATERAL LOW BACK PAIN WITHOUT SCIATICA: Primary | ICD-10-CM

## 2021-05-06 DIAGNOSIS — M54.50 CHRONIC BILATERAL LOW BACK PAIN WITHOUT SCIATICA: Primary | ICD-10-CM

## 2021-05-06 DIAGNOSIS — M51.36 LUMBAR DEGENERATIVE DISC DISEASE: ICD-10-CM

## 2021-05-06 NOTE — TELEPHONE ENCOUNTER
S/W pt who states he has had no relief after SIJ Inj  States 2 days ago, his pain was severe for 2 days and nothing relieved it  States pain is now 4-6/10 and constant  Explained MBB/RFA process to pt but he stated he did not just want to inject more stuff into his body  Explained that MBB/RFA did not require steroid and was just numbing agent  Pt states he would like to go on SP's recommendation to get a second surgical opinion since Dr Shant Cheney was not comfortable operating on a person so young      Pt states "something has to be done because the pain is affecting my everyday life "  States "I don't want to wait around for a couple of weeks to see if something is going to help me, I want relief today "    Please advise

## 2021-05-06 NOTE — TELEPHONE ENCOUNTER
I will refer him to Dr Nenita Klein  It is unclear what is generating his pain since he failed all treatments so far  The MBB is a diagnostic procedure that doesn't involve steroid

## 2021-05-06 NOTE — TELEPHONE ENCOUNTER
S/W pt and advised of referral  Dr Jarret Quiroz information given  Advised pt to f/u after appt  Pt appreciatve

## 2021-05-10 ENCOUNTER — OFFICE VISIT (OUTPATIENT)
Dept: OBGYN CLINIC | Facility: CLINIC | Age: 26
End: 2021-05-10
Payer: COMMERCIAL

## 2021-05-10 VITALS
HEART RATE: 79 BPM | BODY MASS INDEX: 26.63 KG/M2 | WEIGHT: 186 LBS | HEIGHT: 70 IN | SYSTOLIC BLOOD PRESSURE: 123 MMHG | DIASTOLIC BLOOD PRESSURE: 74 MMHG

## 2021-05-10 DIAGNOSIS — M54.50 LUMBAR PAIN: Primary | ICD-10-CM

## 2021-05-10 DIAGNOSIS — M54.50 CHRONIC BILATERAL LOW BACK PAIN WITHOUT SCIATICA: ICD-10-CM

## 2021-05-10 DIAGNOSIS — G89.29 CHRONIC BILATERAL LOW BACK PAIN WITHOUT SCIATICA: ICD-10-CM

## 2021-05-10 PROCEDURE — 99203 OFFICE O/P NEW LOW 30 MIN: CPT | Performed by: ORTHOPAEDIC SURGERY

## 2021-05-10 NOTE — PROGRESS NOTES
[facility]  Diana Darling MD  [address] [place] [id number]  [phone]    HISTORY OF PRESENT ILLNESS:  Patient is here today for lumbar pain, this is a second opinion for his lumbar Dr Binh Short from [facility] referred him over  He is also seeing Dr Elana Urrutia who has done an epidural and trigger point injection  His most recent injection was done on 4/15/21  He has done physical therapy in the past but has not done any new form of physical therapy  Patient is unsure on how the back injury has happened and has been ongoing for almost 3-4 years  Patient had 2 MRI done, one through [facility] and the other through [facility] in  He did not bring the CDs with him today  Patient can stand straight up before moving around 30-60 minutes max  Patient can walk for about 8 hours a day because of work but sometimes he needs to rest or stretch to help with the discomfort  Sitting he can do for about 20 minutes max before he needs to move around  Both sides feel about the same for him for pain  68-year-old gentleman with chronic history of low back pain of 3 years duration  He had been seen at [facility] by Dr Binh Short who referred to Pain Management  He has been seen by [facility] has had 2 injections  He was scheduled to undergo a 3rd injection  He came for 2nd opinion prior to that injection  He has gone to physical therapy which aggravated his symptoms  He has used muscle relaxers and anti-inflammatories  By the end of the examine became apparent that the claimant has been seen by multiple providers  It also became apparent that the claimant has been under care of Dr Burton Part  Initially my assistant and placed the patient on the x-ray list   I was able to review an MRI report of the lumbar spine from late last year from [facility]    I canceled the x-rays given the patient's young age and did not want him to get radiated given the fact that there were fluoroscopic images available and that he has had a recent MRI study  ALLERGIES: No Known Allergies    MEDICATIONS:    Current Outpatient Medications:     albuterol (2 5 mg/3 mL) 0 083 % nebulizer solution, Inhale 2 5 mg every 4 (four) hours as needed, Disp: , Rfl:     albuterol (2 5 mg/3 mL) 0 083 % nebulizer solution, TAKE 1 VIAL BY NEBULIZATION EVERY 4 HOURS AS NEEDED FOR WHEEZING , Disp: , Rfl:     buPROPion (WELLBUTRIN XL) 150 mg 24 hr tablet, Take 150 mg by mouth, Disp: , Rfl:     esomeprazole (NexIUM) 40 MG capsule, Take 40 mg by mouth, Disp: , Rfl:     hydrOXYzine HCL (ATARAX) 25 mg tablet, TAKE 1 TABLET (25 MG TOTAL) BY MOUTH EVERY 6 (SIX) HOURS AS NEEDED FOR ITCHING , Disp: , Rfl:     meloxicam (MOBIC) 15 mg tablet, Take 1 tablet (15 mg total) by mouth daily as needed for moderate pain, Disp: 30 tablet, Rfl: 0    methocarbamol (ROBAXIN) 500 mg tablet, Take 1 tablet (500 mg total) by mouth 3 (three) times a day, Disp: 60 tablet, Rfl: 0    montelukast (SINGULAIR) 10 mg tablet, Take 1 tablet by mouth daily, Disp: , Rfl:     NON FORMULARY, Medical marijuana, Disp: , Rfl:     tiotropium (Spiriva Respimat) 2 5 MCG/ACT AERS inhaler, Inhale daily, Disp: , Rfl:      PAST MEDICAL HISTORY:   Past Medical History:   Diagnosis Date    Asthma        PAST SURGICAL HISTORY:  History reviewed  No pertinent surgical history  SOCIAL HISTORY:  Social History     Tobacco Use   Smoking Status Current Every Day Smoker   Smokeless Tobacco Never Used        ROS:  Review of Systems   Constitutional: Negative  HENT: Negative  Eyes: Negative  Respiratory: Negative  Cardiovascular: Negative  Gastrointestinal: Negative  Endocrine: Negative  Genitourinary: Negative  Musculoskeletal: Positive for back pain  Skin: Negative  Allergic/Immunologic: Positive for environmental allergies  Neurological: Negative  Hematological: Negative  Psychiatric/Behavioral: Negative  PHYSICAL EXAM:      RADIOGRAPHIC STUDIES:  No orders to display      1  MR for lumbar spine was reviewed from [facility]  Films were not available  Degenerative disc disease without significant stenosis was noted at L4-5  ASSESSMENT:  1  Lumbar pain    2  Chronic bilateral low back pain without sciatica  -     Ambulatory referral to Orthopedic Surgery    PLAN:  I did explain to the patient and given the fact that he is young and the patient is axial, it is unlikely that surgery can be helpful  He went on to state that he is getting sick of being told he has  young  [place] informed him that I do not have any information regarding his past treatment  Was not until that the ended up the exam that he had mentioned that he has had multiple MRI studies  Initially he also denied seeing Dr Nas Schaffer despite the fact that is extremely unlikely that he did not remember being seen by physician in 2020 and for getting that he was examined  When asked regarding the prior records, he questions why it is his responsibility to provide that information  I had reviewed Dr Us Race record and I was aware of the claimant's past narcotic usage including the fact that it was on Suboxone  He became angry when asked in questions regarding prior medication use and Suboxone  He actually told me that he has stopped using Suboxone  Had an exam he became belligerent used profanity and walk that exam room  Unfortunately it is extremely unlikely that any type of treatment is going to improve the claimant's condition on even if he was willing to provide the appropriate information  It is extremely difficult to treat and individual was not willing to take responsibility and provide adequate history to the physician  Also found it problematic that he was not willing to bring the diagnostic studies for further clarification of his condition         Scribe Attestation    I,:   am acting as a scribe while in the presence of the attending physician :       I,:   personally performed the services described in this documentation    as scribed in my presence :

## 2021-06-09 ENCOUNTER — TELEPHONE (OUTPATIENT)
Dept: PAIN MEDICINE | Facility: CLINIC | Age: 26
End: 2021-06-09

## 2021-06-09 NOTE — TELEPHONE ENCOUNTER
See below pt question   Please review 4/22 task, as well as OV note from Dr Matthew Dumont, please review PLAN section  Please advise next step

## 2021-06-09 NOTE — TELEPHONE ENCOUNTER
Pt stated about him getting another injection   Pt would like to speak with someone to find out what is in the injections that he is receiving and is going to receive    Pt # [phone]

## 2021-06-16 NOTE — TELEPHONE ENCOUNTER
The next part was a diagnostic MBB but patient didn't seem to keen on this  Also seems as though he did not provide proper imaging for Dr Ynes Kendrick to make an adequate assessment  Aside from this I don't know what else iw ould be able to offer the patient

## 2021-06-16 NOTE — TELEPHONE ENCOUNTER
S/w pt  Pt agreeable to MBB  Please place order  Provided pt info on MBB/RFA   Will also mail procedure brochures to pt at this time

## 2021-06-17 NOTE — TELEPHONE ENCOUNTER
S/w pt and scheduled MBB for 7/1/21 315 pm arrival  Gave pre procedure instructions, masking/ policy

## 2021-07-01 NOTE — TELEPHONE ENCOUNTER
Patient calling to find out should he come to procedure today at 3:30 pm if his pain is 2/10? He also wants to know why does he need to be in pain? Patient decided not to come while on hold for nurse and   Please call him back to reschedule procedure       Thank you      [phone]

## 2021-07-13 ENCOUNTER — HOSPITAL ENCOUNTER (OUTPATIENT)
Dept: RADIOLOGY | Facility: CLINIC | Age: 26
Discharge: HOME | End: 2021-07-13
Admitting: STUDENT IN AN ORGANIZED HEALTH CARE EDUCATION/TRAINING PROGRAM
Payer: COMMERCIAL

## 2021-07-13 VITALS
TEMPERATURE: 96.4 F | OXYGEN SATURATION: 97 % | DIASTOLIC BLOOD PRESSURE: 82 MMHG | HEART RATE: 72 BPM | RESPIRATION RATE: 18 BRPM | SYSTOLIC BLOOD PRESSURE: 139 MMHG

## 2021-07-13 DIAGNOSIS — M47.816 LUMBAR FACET ARTHROPATHY: ICD-10-CM

## 2021-07-13 PROCEDURE — 64494 INJ PARAVERT F JNT L/S 2 LEV: CPT | Performed by: STUDENT IN AN ORGANIZED HEALTH CARE EDUCATION/TRAINING PROGRAM

## 2021-07-13 PROCEDURE — 64493 INJ PARAVERT F JNT L/S 1 LEV: CPT | Performed by: STUDENT IN AN ORGANIZED HEALTH CARE EDUCATION/TRAINING PROGRAM

## 2021-07-13 RX ORDER — BUPIVACAINE HCL/PF 2.5 MG/ML
3 VIAL (ML) INJECTION ONCE
Status: COMPLETED | OUTPATIENT
Start: 2021-07-13 | End: 2021-07-13

## 2021-07-13 RX ADMIN — Medication 3 ML: at 14:48

## 2021-07-13 NOTE — DISCHARGE INSTR - LAB
ACTIVITY  · Please do activities that will bring on the normal pain that we are rating  For example, if vacuuming or walking increases the pain, do that  This will give the most accurate response to the diary  · You may shower, but no tub baths today, or applied heat  CARE OF THE INJECTION SITE  · This area may be numb for several hours after the injection  · Notify [facility] if you have any of the following:  redness, drainage, swelling, or fever above 100°F     SPECIAL INSTRUCTIONS  · Please return the MBB diary to our office by mail, fax, or drop it off  MEDICATIONS  · Please do not take any break through or short acting pain medications for 8 hours after the block  · Continue to take all routine medications  · Our office may have instructed you to hold some medications  As no general anesthesia was used in today's procedure, you should not experience any side effects related to anesthesia  If you have a problem specifically related to your procedure, please call our office at [phone]  Problems not related to your procedure should be directed to your primary care physician

## 2021-07-13 NOTE — INTERVAL H&P NOTE
Update: (This section must be completed if the H&P was completed greater than 24 hrs to procedure or admission)    H&P reviewed  After examining the patient, I find no changed to the H&P since it had been written  Patient re-evaluated   Accept as history and physical     Zaria Eldridge MD/July 13, 2021/2:54 PM

## 2021-07-13 NOTE — H&P
History of Present Illness: The patient is a 27 y o  male who presents with complaints of low back pain    Patient Active Problem List   Diagnosis    Lumbar pain       Past Medical History:   Diagnosis Date    Asthma        No past surgical history on file  Current Outpatient Medications:     albuterol (2 5 mg/3 mL) 0 083 % nebulizer solution, Inhale 2 5 mg every 4 (four) hours as needed, Disp: , Rfl:     albuterol (2 5 mg/3 mL) 0 083 % nebulizer solution, TAKE 1 VIAL BY NEBULIZATION EVERY 4 HOURS AS NEEDED FOR WHEEZING , Disp: , Rfl:     buPROPion (WELLBUTRIN XL) 150 mg 24 hr tablet, Take 150 mg by mouth, Disp: , Rfl:     esomeprazole (NexIUM) 40 MG capsule, Take 40 mg by mouth, Disp: , Rfl:     hydrOXYzine HCL (ATARAX) 25 mg tablet, TAKE 1 TABLET (25 MG TOTAL) BY MOUTH EVERY 6 (SIX) HOURS AS NEEDED FOR ITCHING , Disp: , Rfl:     meloxicam (MOBIC) 15 mg tablet, Take 1 tablet (15 mg total) by mouth daily as needed for moderate pain, Disp: 30 tablet, Rfl: 0    methocarbamol (ROBAXIN) 500 mg tablet, Take 1 tablet (500 mg total) by mouth 3 (three) times a day, Disp: 60 tablet, Rfl: 0    montelukast (SINGULAIR) 10 mg tablet, Take 1 tablet by mouth daily, Disp: , Rfl:     NON FORMULARY, Medical marijuana, Disp: , Rfl:     tiotropium (Spiriva Respimat) 2 5 MCG/ACT AERS inhaler, Inhale daily, Disp: , Rfl:     No Known Allergies    Physical Exam: There were no vitals filed for this visit  General: Awake, Alert, Oriented x 3, Mood and affect appropriate  Respiratory: Respirations even and unlabored  Cardiovascular: Peripheral pulses intact; no edema  Musculoskeletal Exam: low back pain    ASA Score: 2    Patient/Chart Verification  Consents Confirmed: Procedural  H&P( within 30 days) Verified: To be obtained in the Pre-Procedure area  Interval H&P(within 24 hr) Complete (required for Outpatients and Surgery Admit only): To be obtained in the Pre-Procedure area  Allergies Reviewed:  Yes  Anticoag/NSAID held?: NA  Currently on antibiotics?: No    Assessment:   1   Lumbar facet arthropathy        Plan: b/l L4-5 and l5-s1 MBB #1

## 2021-07-22 ENCOUNTER — TELEPHONE (OUTPATIENT)
Dept: RADIOLOGY | Facility: CLINIC | Age: 26
End: 2021-07-22

## 2021-07-22 NOTE — TELEPHONE ENCOUNTER
S/P Dayron L4-S1 MBB #1 on 7/13/21    Pt reports Less than 80% relief throughout trial  Please advise next step

## 2021-08-10 NOTE — TELEPHONE ENCOUNTER
Would consider returning to his office given lack of relief with other treatments  If there was a procedure that helped even moderately, we could consider repeating it, but he didn't get relief from anything we tried   He may benefit from reengaging in PT but his work schedule may not allow it

## 2021-08-10 NOTE — TELEPHONE ENCOUNTER
Unfortunately I do not know that we have anything additional to offer  We've tried TPI, SI injection, MBB, and LESI all without any relief  The pain generator is not clear  Only other source could be possibly from the disc itself and the diagnostic procedure for that (discogram) can be painful, and I do not currently perform this  He may want to consider a second opinion

## 2021-08-10 NOTE — TELEPHONE ENCOUNTER
S/W pt and advised of below  Pt states he would like to know who he should go to for second opinion as he did go see Dr Bright Peralta and was basically kicked out of his office after being accused of soliciting for pain meds  Pt states he is not looking for pain meds, just a solution for his pain  States he was told by Dr Marilin Ann that he was too young for surgery, but should he go back and see him given that everything SP has tried has not worked? Pt would like advice on where to go form here

## 2021-08-10 NOTE — TELEPHONE ENCOUNTER
Please send Sr Naik's medical records to Dr Daisy Flores of [facility] per pt request  General release on file  Pt aware another release may be needed      *S/W pt and advised of below*

## 2021-08-27 ENCOUNTER — TELEPHONE (OUTPATIENT)
Dept: PAIN MEDICINE | Facility: CLINIC | Age: 26
End: 2021-08-27

## 2021-09-10 ENCOUNTER — TELEPHONE (OUTPATIENT)
Dept: UROLOGY | Facility: MEDICAL CENTER | Age: 26
End: 2021-09-10

## 2021-09-10 NOTE — TELEPHONE ENCOUNTER
Patient had [address] on 9/9 that showed: Impression    IMPRESSION:     1  At the site of the palpable abnormality on the left scrotal wall demonstrates   a nonspecific hypoechoic lesion with minimal vascularity measuring 1 1 x 0 5 x   0 9 cm  Continued clinical follow-up is recommended to resolution  2  Otherwise, no acute findings  Workstation:[id number]      Please review and advise on when patient should be seen       Thanks

## 2021-09-10 NOTE — TELEPHONE ENCOUNTER
Please Triage - [facility] Patient-     What is the reason for the patients appointment? Jose Viera from [facility] called patient has a scrotal lesion   Results are in care every where for review  Patient prefers Friday appointment  Patient can be contacted directly        Imaging/Lab Results:uls of testes       Do we accept the patient's insurance or is the patient Self-Pay? Provider & Plan: Michael   Member ID#:       Has the patient had any previous urologist(s)?no        Have patient records been requested?care every where        Has the patient had any outside testing done?u/s of scrotal in care every where       Does the patient have a personal history of cancer?no       Patient can be reached at :[phone] (

## 2021-09-16 NOTE — PROGRESS NOTES
9/17/2021      No chief complaint on file  Assessment and Plan    27 y o  male new patient    1  Left scrotal lump  - US scrotum/testicles from 9/9/21 shows a scrotal wall nonspecific hypoechoic lesion with minimal vascularity measuring 1 1 x 0 5 x 0 9 cm   - Will repeat US in 6 months to ensure resolution    History of Present Illness  Kimberli Velasquez is a 27 y o  male here for new patient evaluation of left scrotal lump  He completed an US scrotum/testicles on 9/9/21 which showed at the site of palpable abnormality on the left scrotal wall a nonspecific hypoechoic lesion with minimal vascularity is seen measuring 1 1 x 0 5 x 0 9 cm  Review of Systems               Past Medical History  Past Medical History:   Diagnosis Date    Asthma        Past Social History  No past surgical history on file  Social History     Tobacco Use   Smoking Status Current Every Day Smoker   Smokeless Tobacco Never Used       Past Family History  No family history on file  Past Social history  Social History     Socioeconomic History    Marital status: /Civil Union     Spouse name: Not on file    Number of children: Not on file    Years of education: Not on file    Highest education level: Not on file   Occupational History    Not on file   Tobacco Use    Smoking status: Current Every Day Smoker    Smokeless tobacco: Never Used   Vaping Use    Vaping Use: Never used   Substance and Sexual Activity    Alcohol use: Not Currently    Drug use: Yes     Types: Marijuana    Sexual activity: Not on file   Other Topics Concern    Not on file   Social History Narrative    Not on file     Social Determinants of Health     Financial Resource Strain:     Difficulty of Paying Living Expenses:    Food Insecurity:     Worried About [address] in the Last Year:     [address] in the Last Year:    Transportation Needs:     Lack of Transportation (Medical):      Lack of Transportation (Non-Medical): Physical Activity:     Days of Exercise per Week:     Minutes of Exercise per Session:    Stress:     Feeling of Stress :    Social Connections:     Frequency of Communication with Friends and Family:     Frequency of Social Gatherings with Friends and Family:     Attends Restorationism Services:     Active Member of Clubs or Organizations:     Attends Club or Organization Meetings:     Marital Status:    Intimate Partner Violence:     Fear of Current or Ex-Partner:     Emotionally Abused:     Physically Abused:     Sexually Abused:        Current Medications  Current Outpatient Medications   Medication Sig Dispense Refill    albuterol (2 5 mg/3 mL) 0 083 % nebulizer solution Inhale 2 5 mg every 4 (four) hours as needed      albuterol (2 5 mg/3 mL) 0 083 % nebulizer solution TAKE 1 VIAL BY NEBULIZATION EVERY 4 HOURS AS NEEDED FOR WHEEZING   buPROPion (WELLBUTRIN XL) 150 mg 24 hr tablet Take 150 mg by mouth      esomeprazole (NexIUM) 40 MG capsule Take 40 mg by mouth      hydrOXYzine HCL (ATARAX) 25 mg tablet TAKE 1 TABLET (25 MG TOTAL) BY MOUTH EVERY 6 (SIX) HOURS AS NEEDED FOR ITCHING   meloxicam (MOBIC) 15 mg tablet Take 1 tablet (15 mg total) by mouth daily as needed for moderate pain 30 tablet 0    methocarbamol (ROBAXIN) 500 mg tablet Take 1 tablet (500 mg total) by mouth 3 (three) times a day 60 tablet 0    montelukast (SINGULAIR) 10 mg tablet Take 1 tablet by mouth daily      NON FORMULARY Medical marijuana      tiotropium (Spiriva Respimat) 2 5 MCG/ACT AERS inhaler Inhale daily       No current facility-administered medications for this visit  Allergies  No Known Allergies      The following portions of the patient's history were reviewed and updated as appropriate: allergies, current medications, past medical history, past social history, past surgical history and problem list       Vitals  There were no vitals filed for this visit          Physical Exam  Physical Exam      Results  No results found for this or any previous visit (from the past 1 hour(s))  ]  No results found for: PSA  No results found for: GLUCOSE, CALCIUM, NA, K, CO2, CL, BUN, CREATININE  No results found for: WBC, HGB, HCT, MCV, PLT        Orders  No orders of the defined types were placed in this encounter      Flor Wiggins PA-C

## 2021-09-17 ENCOUNTER — OFFICE VISIT (OUTPATIENT)
Dept: UROLOGY | Facility: CLINIC | Age: 26
End: 2021-09-17
Payer: COMMERCIAL

## 2021-09-17 VITALS
SYSTOLIC BLOOD PRESSURE: 116 MMHG | WEIGHT: 183.2 LBS | DIASTOLIC BLOOD PRESSURE: 68 MMHG | BODY MASS INDEX: 26.23 KG/M2 | HEART RATE: 72 BPM | HEIGHT: 70 IN

## 2021-09-17 DIAGNOSIS — N50.89 LUMP IN SCROTUM: Primary | ICD-10-CM

## 2021-09-17 PROCEDURE — 99203 OFFICE O/P NEW LOW 30 MIN: CPT | Performed by: PHYSICIAN ASSISTANT

## 2021-09-17 RX ORDER — ALBUTEROL SULFATE 90 UG/1
AEROSOL, METERED RESPIRATORY (INHALATION)
COMMUNITY
Start: 2021-09-16

## 2021-09-17 NOTE — PROGRESS NOTES
9/17/2021      Chief Complaint   Patient presents with    Scrotal Abscess       Assessment and Plan    1  Left scrotal lump  2  Scrotal abscess  -  Patient states he "popped" previous scrotal lump on the left and states it is no longer there  Denies any scrotal erythema, active discharge, or any testicular pain  States he does get recurrent scrotal abscesses  Physical exam negative for any palpable abscesses today  - Advised patient to refrain from draining these abscesses in the future and that he may want to consider surgical removal to prevent abscess recurrence  - US scrotum/testicles from 9/9/21 shows a scrotal wall nonspecific hypoechoic lesion with minimal vascularity measuring 1 1 x 0 5 x 0 9 cm   - Will repeat US in 3 months to ensure resolution of previous area seen on imaging  History of Present Illness  Juni Cosby is a 27 y o  male here for new patient evaluation of left scrotal lump  He completed an US scrotum/testicles on 9/9/21 which showed at the site of palpable abnormality on the left scrotal wall a nonspecific hypoechoic lesion with minimal vascularity is seen measuring 1 1 x 0 5 x 0 9 cm  Patient reports he gets recurrent scrotal abscesses  Currently denies any inflamed abscesses today and states he popped the lump that was previously on the left scrotum  He denies any scrotal erythema, rashes, pain, swelling, or discharge  Denies any fever or chills  He denies any prior urologic history or  surgical manipulation  Denies any family history of  malignancy  Denies any concern for STDs  Review of Systems   Constitutional: Negative for chills and fever  Respiratory: Negative for shortness of breath  Cardiovascular: Negative for chest pain  Gastrointestinal: Negative for abdominal pain     Genitourinary: Negative for difficulty urinating, discharge, dysuria, flank pain, frequency, genital sores, hematuria, penile pain, penile swelling, scrotal swelling, testicular pain and urgency  Skin: Negative for rash  Neurological: Negative for dizziness  Past Medical History  Past Medical History:   Diagnosis Date    Asthma     Scrotal abscess        Past Social History  History reviewed  No pertinent surgical history  Social History     Tobacco Use   Smoking Status Current Every Day Smoker   Smokeless Tobacco Former User       Past Family History  Family History   Problem Relation Age of Onset    No Known Problems Father     No Known Problems Mother     No Known Problems Son     No Known Problems Son     No Known Problems Son        Past Social history  Social History     Socioeconomic History    Marital status: /Civil Union     Spouse name: Not on file    Number of children: Not on file    Years of education: Not on file    Highest education level: Not on file   Occupational History    Not on file   Tobacco Use    Smoking status: Current Every Day Smoker    Smokeless tobacco: Former User   Vaping Use    Vaping Use: Never used   Substance and Sexual Activity    Alcohol use: Not Currently    Drug use: Yes     Types: Marijuana    Sexual activity: Not on file   Other Topics Concern    Not on file   Social History Narrative    Not on file     Social Determinants of Health     Financial Resource Strain:     Difficulty of Paying Living Expenses:    Food Insecurity:     Worried About [email or web address] in the Last Year:     [address] in the Last Year:    Transportation Needs:     Lack of Transportation (Medical):      Lack of Transportation (Non-Medical):    Physical Activity:     Days of Exercise per Week:     Minutes of Exercise per Session:    Stress:     Feeling of Stress :    Social Connections:     Frequency of Communication with Friends and Family:     Frequency of Social Gatherings with Friends and Family:     Attends Christianity Services:     Active Member of Clubs or Organizations:     Attends Club or Organization Meetings:     Marital Status:    Intimate Partner Violence:     Fear of Current or Ex-Partner:     Emotionally Abused:     Physically Abused:     Sexually Abused:        Current Medications  Current Outpatient Medications   Medication Sig Dispense Refill    albuterol (2 5 mg/3 mL) 0 083 % nebulizer solution Inhale 2 5 mg every 4 (four) hours as needed      albuterol (PROVENTIL HFA,VENTOLIN HFA) 90 mcg/act inhaler inhale 2 puffs by mouth and INTO THE LUNGS every 6 hours if needed      NON FORMULARY Medical marijuana      albuterol (2 5 mg/3 mL) 0 083 % nebulizer solution TAKE 1 VIAL BY NEBULIZATION EVERY 4 HOURS AS NEEDED FOR WHEEZING  (Patient not taking: Reported on 9/17/2021)      buPROPion (WELLBUTRIN XL) 150 mg 24 hr tablet Take 150 mg by mouth Will be starting soon      esomeprazole (NexIUM) 40 MG capsule Take 40 mg by mouth      hydrOXYzine HCL (ATARAX) 25 mg tablet TAKE 1 TABLET (25 MG TOTAL) BY MOUTH EVERY 6 (SIX) HOURS AS NEEDED FOR ITCHING   meloxicam (MOBIC) 15 mg tablet Take 1 tablet (15 mg total) by mouth daily as needed for moderate pain 30 tablet 0    methocarbamol (ROBAXIN) 500 mg tablet Take 1 tablet (500 mg total) by mouth 3 (three) times a day 60 tablet 0    montelukast (SINGULAIR) 10 mg tablet Take 1 tablet by mouth daily      tiotropium (Spiriva Respimat) 2 5 MCG/ACT AERS inhaler Inhale daily       No current facility-administered medications for this visit  Allergies  Allergies   Allergen Reactions    Other Sneezing         The following portions of the patient's history were reviewed and updated as appropriate: allergies, current medications, past medical history, past social history, past surgical history and problem list       Vitals  Vitals:    09/17/21 1304   BP: 116/68   Pulse: 72   Weight: 83 1 kg (183 lb 3 2 oz)   Height: 5' 10" (1 778 m)           Physical Exam  Physical Exam  Constitutional:       Appearance: Normal appearance  He is normal weight     HENT: Head: Normocephalic and atraumatic  Right Ear: External ear normal       Left Ear: External ear normal    Eyes:      General: No scleral icterus  Conjunctiva/sclera: Conjunctivae normal    Cardiovascular:      Pulses: Normal pulses  Pulmonary:      Effort: Pulmonary effort is normal    Genitourinary:     Penis: Normal        Comments: Testicles descended and symmetric without palpable masses or abnormalities  No tenderness, erythema, or swelling  Areas of healing from previously "popped" abscesses  No signs of infection or indurated areas  Musculoskeletal:         General: Normal range of motion  Cervical back: Normal range of motion  Skin:     General: Skin is warm and dry  Neurological:      General: No focal deficit present  Mental Status: He is alert and oriented to person, place, and time  Psychiatric:         Mood and Affect: Mood normal          Behavior: Behavior normal          Thought Content: Thought content normal          Judgment: Judgment normal            Results  No results found for this or any previous visit (from the past 1 hour(s))  ]  No results found for: PSA  No results found for: GLUCOSE, CALCIUM, NA, K, CO2, CL, BUN, CREATININE  No results found for: WBC, HGB, HCT, MCV, PLT        Orders  No orders of the defined types were placed in this encounter        Talha Farias PA-C

## 2021-12-16 ENCOUNTER — TELEPHONE (OUTPATIENT)
Dept: UROLOGY | Facility: CLINIC | Age: 26
End: 2021-12-16

## 2022-02-16 ENCOUNTER — HOSPITAL ENCOUNTER (OUTPATIENT)
Dept: RADIOLOGY | Facility: HOSPITAL | Age: 27
Discharge: HOME | End: 2022-02-16
Attending: RADIOLOGY | Admitting: RADIOLOGY
Payer: COMMERCIAL

## 2022-02-16 DIAGNOSIS — M54.50 LUMBAR PAIN: ICD-10-CM

## 2022-02-16 PROCEDURE — 72295 X-RAY OF LOWER SPINE DISK: CPT | Performed by: RADIOLOGY

## 2022-02-16 PROCEDURE — 62290 NJX PX DISCOGRAPHY LUMBAR: CPT | Performed by: RADIOLOGY

## 2022-02-16 PROCEDURE — 72285 DISCOGRAPHY CERV/THOR SPINE: CPT

## 2022-02-16 PROCEDURE — 62290 NJX PX DISCOGRAPHY LUMBAR: CPT

## 2022-02-16 NOTE — BRIEF OP NOTE (RAD/CATH)
INTERVENTIONAL RADIOLOGY PROCEDURE NOTE    Date: 2/16/2022    Procedure: IR DISCOGRAM    Preoperative diagnosis:   1  Lumbar pain         Postoperative diagnosis: Same  Surgeon: Casandra Espinal MD     Assistant: None  No qualified resident was available  Blood loss: none    Specimens: none     Findings:   L3-4: 5/10 pain  Back pressure with shock down right leg  Atypical pain  L4-5: 8/10 central back pain which is typical     L4-S1: 2/10 back tightness which is typical     CT- L3-4 Right posterior lateral contrast extrav into the neural foramen  L4-5 Far right lateral contrast extrav    Complications: None immediate      Anesthesia: local
